# Patient Record
Sex: FEMALE | Race: WHITE | NOT HISPANIC OR LATINO | Employment: UNEMPLOYED | URBAN - METROPOLITAN AREA
[De-identification: names, ages, dates, MRNs, and addresses within clinical notes are randomized per-mention and may not be internally consistent; named-entity substitution may affect disease eponyms.]

---

## 2024-12-06 ENCOUNTER — OFFICE VISIT (OUTPATIENT)
Dept: SURGERY | Facility: CLINIC | Age: 39
End: 2024-12-06
Payer: COMMERCIAL

## 2024-12-06 VITALS — HEIGHT: 64 IN | WEIGHT: 224 LBS | BODY MASS INDEX: 38.24 KG/M2

## 2024-12-06 DIAGNOSIS — K21.9 GASTROESOPHAGEAL REFLUX DISEASE, UNSPECIFIED WHETHER ESOPHAGITIS PRESENT: ICD-10-CM

## 2024-12-06 PROCEDURE — 3008F BODY MASS INDEX DOCD: CPT | Performed by: SURGERY

## 2024-12-06 PROCEDURE — 99204 OFFICE O/P NEW MOD 45 MIN: CPT | Performed by: SURGERY

## 2024-12-06 ASSESSMENT — ENCOUNTER SYMPTOMS
APPETITE CHANGE: 0
CONFUSION: 0
DIZZINESS: 0
SHORTNESS OF BREATH: 0
AGITATION: 0
DIAPHORESIS: 0
ACTIVITY CHANGE: 0
CHILLS: 0
FEVER: 0
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
## 2024-12-18 ENCOUNTER — TELEPHONE (OUTPATIENT)
Dept: PREADMISSION TESTING | Facility: HOSPITAL | Age: 39
End: 2024-12-18
Payer: COMMERCIAL

## 2024-12-18 RX ORDER — SERTRALINE HYDROCHLORIDE 100 MG/1
200 TABLET, FILM COATED ORAL DAILY
COMMUNITY

## 2024-12-18 RX ORDER — NEBIVOLOL 20 MG/1
20 TABLET ORAL DAILY
COMMUNITY

## 2024-12-18 RX ORDER — TIRZEPATIDE 15 MG/.5ML
15 INJECTION, SOLUTION SUBCUTANEOUS WEEKLY
COMMUNITY

## 2024-12-18 RX ORDER — ALPRAZOLAM 2 MG/1
2 TABLET ORAL DAILY
COMMUNITY

## 2024-12-18 RX ORDER — BISMUTH SUBSALICYLATE 262 MG
1 TABLET,CHEWABLE ORAL DAILY
COMMUNITY

## 2024-12-18 RX ORDER — OMEPRAZOLE 40 MG/1
40 CAPSULE, DELAYED RELEASE ORAL
COMMUNITY

## 2024-12-18 NOTE — TELEPHONE ENCOUNTER
Pre-procedure PAT phone assessment completed. Pre-operative and medication instructions reviewed with patient. Pt states last dose of Mounjaro was 12/15. Instructed to hold MVI now. Patient verbalizes understanding of instructions.  SURGERY PRE-OPERATIVE INSTRUCTIONS    *You will receive a phone call the day before your procedure  after 2pm, (or the Friday before your surgery if scheduled on a Monday.) Generally the hospital will be calling you with this information after that time.    *You are not to eat after midnight the night before the surgery. You may have up to 13 ounces of clear liquids up until 2 hours prior to arriving to the hospital. The exception is with medications you were instructed to take day of surgery.    *You may take tylenol for pain/discomfort as needed.     *Stop taking all aspirin products, ibuprofen (motrin/advil), naproxen (aleve/naprosyn) for one week prior to surgery.    *Stop taking all vitamins and supplements one week prior to surgery.     *You should not have alcoholic beverages for 24 hours before surgery.     *You should not smoke 24 hours prior to surgery.     *To help prevent surgical infections bathe/shower with Dial soap the evening before surgery.    *You can wear deodorant but no lotion, powder, or perfume/cologne. You should remove all make-up and nail polish at home.    *If you wear glasses, please bring a case for the glasses with you.    *You will be asked to remove dentures and contacts.     *Please leave all valuables at home.    *You should wear loose, comfortable clothing that will accommodate bandages and/or casts.    *You should notify your doctor of any change in your condition (fever, cold, rash, etc). Surgery may need to be re-scheduled until a time you are in better health.    *A responsible adult is required to accompany you to and from the hospital if you are receiving anesthesia or a sedative. Patients are not permitted to drive for 24 hours after anesthesia.      *You can use the  [employer] if you wish.     *If you have any further questions please call [facility] [phone].

## 2024-12-20 ENCOUNTER — ANESTHESIA EVENT (OUTPATIENT)
Dept: OPERATING ROOM | Facility: HOSPITAL | Age: 39
End: 2024-12-20
Payer: COMMERCIAL

## 2024-12-20 RX ORDER — ONDANSETRON HYDROCHLORIDE 2 MG/ML
4 INJECTION, SOLUTION INTRAVENOUS ONCE AS NEEDED
Status: CANCELLED | OUTPATIENT
Start: 2024-12-20

## 2024-12-20 RX ORDER — DROPERIDOL 2.5 MG/ML
0.62 INJECTION, SOLUTION INTRAMUSCULAR; INTRAVENOUS ONCE AS NEEDED
Status: CANCELLED | OUTPATIENT
Start: 2024-12-20

## 2024-12-23 ENCOUNTER — HOSPITAL ENCOUNTER (OUTPATIENT)
Dept: OPERATING ROOM | Facility: HOSPITAL | Age: 39
Setting detail: OUTPATIENT SURGERY
Discharge: HOME | End: 2024-12-23
Payer: COMMERCIAL

## 2024-12-23 ENCOUNTER — ANESTHESIA (OUTPATIENT)
Dept: OPERATING ROOM | Facility: HOSPITAL | Age: 39
End: 2024-12-23
Payer: COMMERCIAL

## 2024-12-23 VITALS
OXYGEN SATURATION: 98 % | HEART RATE: 68 BPM | RESPIRATION RATE: 15 BRPM | SYSTOLIC BLOOD PRESSURE: 123 MMHG | BODY MASS INDEX: 38.01 KG/M2 | WEIGHT: 222.66 LBS | HEIGHT: 64 IN | DIASTOLIC BLOOD PRESSURE: 76 MMHG | TEMPERATURE: 97.7 F

## 2024-12-23 DIAGNOSIS — K21.9 GASTROESOPHAGEAL REFLUX DISEASE, UNSPECIFIED WHETHER ESOPHAGITIS PRESENT: ICD-10-CM

## 2024-12-23 LAB — PREGNANCY TEST URINE, POC: NEGATIVE

## 2024-12-23 PROCEDURE — 7100000009 HC PHASE TWO TIME - INITIAL BASE CHARGE: Performed by: ANESTHESIOLOGY

## 2024-12-23 PROCEDURE — 2500000005 HC RX 250 GENERAL PHARMACY W/O HCPCS: Performed by: NURSE ANESTHETIST, CERTIFIED REGISTERED

## 2024-12-23 PROCEDURE — 3700000002 HC GENERAL ANESTHESIA TIME - EACH INCREMENTAL 1 MINUTE: Performed by: ANESTHESIOLOGY

## 2024-12-23 PROCEDURE — 2500000004 HC RX 250 GENERAL PHARMACY W/ HCPCS (ALT 636 FOR OP/ED): Performed by: NURSE ANESTHETIST, CERTIFIED REGISTERED

## 2024-12-23 PROCEDURE — 7100000010 HC PHASE TWO TIME - EACH INCREMENTAL 1 MINUTE: Performed by: ANESTHESIOLOGY

## 2024-12-23 PROCEDURE — 3600000007 HC OR TIME - EACH INCREMENTAL 1 MINUTE - PROCEDURE LEVEL TWO: Performed by: ANESTHESIOLOGY

## 2024-12-23 PROCEDURE — 2720000007 HC OR 272 NO HCPCS: Performed by: ANESTHESIOLOGY

## 2024-12-23 PROCEDURE — 43239 EGD BIOPSY SINGLE/MULTIPLE: CPT | Performed by: SURGERY

## 2024-12-23 PROCEDURE — 3700000001 HC GENERAL ANESTHESIA TIME - INITIAL BASE CHARGE: Performed by: ANESTHESIOLOGY

## 2024-12-23 PROCEDURE — 81025 URINE PREGNANCY TEST: CPT | Performed by: ANESTHESIOLOGY

## 2024-12-23 PROCEDURE — 3600000002 HC OR TIME - INITIAL BASE CHARGE - PROCEDURE LEVEL TWO: Performed by: ANESTHESIOLOGY

## 2024-12-23 RX ORDER — ONDANSETRON HYDROCHLORIDE 2 MG/ML
INJECTION, SOLUTION INTRAVENOUS AS NEEDED
Status: DISCONTINUED | OUTPATIENT
Start: 2024-12-23 | End: 2024-12-23

## 2024-12-23 RX ORDER — PROPOFOL 10 MG/ML
INJECTION, EMULSION INTRAVENOUS AS NEEDED
Status: DISCONTINUED | OUTPATIENT
Start: 2024-12-23 | End: 2024-12-23

## 2024-12-23 RX ORDER — MIDAZOLAM HYDROCHLORIDE 1 MG/ML
INJECTION INTRAMUSCULAR; INTRAVENOUS AS NEEDED
Status: DISCONTINUED | OUTPATIENT
Start: 2024-12-23 | End: 2024-12-23

## 2024-12-23 RX ORDER — LIDOCAINE HYDROCHLORIDE 20 MG/ML
SOLUTION OROPHARYNGEAL AS NEEDED
Status: DISCONTINUED | OUTPATIENT
Start: 2024-12-23 | End: 2024-12-23

## 2024-12-23 RX ORDER — LIDOCAINE HYDROCHLORIDE 10 MG/ML
INJECTION, SOLUTION EPIDURAL; INFILTRATION; INTRACAUDAL; PERINEURAL AS NEEDED
Status: DISCONTINUED | OUTPATIENT
Start: 2024-12-23 | End: 2024-12-23

## 2024-12-23 SDOH — HEALTH STABILITY: MENTAL HEALTH: CURRENT SMOKER: 0

## 2024-12-23 ASSESSMENT — PAIN SCALES - GENERAL
PAINLEVEL_OUTOF10: 0 - NO PAIN
PAINLEVEL_OUTOF10: 0 - NO PAIN
PAIN_LEVEL: 0

## 2024-12-23 ASSESSMENT — PAIN - FUNCTIONAL ASSESSMENT
PAIN_FUNCTIONAL_ASSESSMENT: 0-10
PAIN_FUNCTIONAL_ASSESSMENT: 0-10

## 2024-12-23 NOTE — ANESTHESIA POSTPROCEDURE EVALUATION
Patient: Leeanna Storm    Procedure Summary       Date: 12/23/24 Room / Location: [facility] OR    Anesthesia Start: 0735 Anesthesia Stop: 0804    Procedures:       EGD      BRAVO Diagnosis: Gastroesophageal reflux disease, unspecified whether esophagitis present    Scheduled Providers: Allen GARBER MD; Domo Medrano MD; Noah Boyd RN Responsible Provider: Domo Medrano MD    Anesthesia Type: MAC ASA Status: 3            Anesthesia Type: MAC    Vitals Value Taken Time   /76 12/23/24 0815   Temp 36.5 °C (97.7 °F) 12/23/24 0802   Pulse 68 12/23/24 0815   Resp 15 12/23/24 0815   SpO2 98 12/23/24 1245       Anesthesia Post Evaluation    Patient location during evaluation: PACU  Patient participation: complete - patient participated  Level of consciousness: awake and alert  Pain score: 0  Pain management: adequate  Airway patency: patent  Cardiovascular status: acceptable  Respiratory status: acceptable  Hydration status: acceptable  Postoperative Nausea and Vomiting: none        No notable events documented.

## 2024-12-23 NOTE — ANESTHESIA PREPROCEDURE EVALUATION
Patient: Leeanna Storm    Procedure Information       Anesthesia Start Date/Time: 12/23/24 0735    Scheduled providers: Allen GARBER MD; Domo Medrano MD    Procedures:       EGD      BRAVO    Location: [facility] OR            Relevant Problems   No relevant active problems       Clinical information reviewed:   Tobacco  Allergies  Meds   Med Hx  Surg Hx  OB Status  Fam Hx  Soc   Hx        NPO Detail:  NPO/Void Status  Carbohydrate Drink Given Prior to Surgery? : N  Date of Last Liquid: 12/23/24  Time of Last Liquid: 0100  Date of Last Solid: 12/22/24  Time of Last Solid: 1800  Last Intake Type: Clear fluids  Time of Last Void: 0656         Physical Exam    Airway  Mallampati: III  TM distance: >3 FB  Neck ROM: full     Cardiovascular   Rhythm: regular  Rate: normal     Dental - normal exam       Pulmonary - normal exam     Abdominal - normal exam             Anesthesia Plan    History of general anesthesia?: yes  History of complications of general anesthesia?: no    ASA 3     MAC     The patient is not a current smoker.  Patient was previously instructed to abstain from smoking on day of procedure.  Patient did not smoke on day of procedure.    Anesthetic plan and risks discussed with patient.  Use of blood products discussed with patient who consented to blood products.    Plan discussed with attending.

## 2025-01-02 LAB
LAB AP ASR DISCLAIMER: NORMAL
LABORATORY COMMENT REPORT: NORMAL
PATH REPORT.FINAL DX SPEC: NORMAL
PATH REPORT.GROSS SPEC: NORMAL
PATH REPORT.RELEVANT HX SPEC: NORMAL
PATH REPORT.TOTAL CANCER: NORMAL

## 2025-01-03 ENCOUNTER — DOCUMENTATION (OUTPATIENT)
Dept: SURGERY | Facility: CLINIC | Age: 40
End: 2025-01-03
Payer: COMMERCIAL

## 2025-01-03 DIAGNOSIS — A04.8 H. PYLORI INFECTION: ICD-10-CM

## 2025-01-03 DIAGNOSIS — Z98.84 S/P BARIATRIC SURGERY: ICD-10-CM

## 2025-01-03 RX ORDER — METRONIDAZOLE 500 MG/1
500 TABLET ORAL 3 TIMES DAILY
Qty: 42 TABLET | Refills: 0 | Status: SHIPPED | OUTPATIENT
Start: 2025-01-03 | End: 2025-01-17

## 2025-01-03 RX ORDER — TETRACYCLINE HYDROCHLORIDE 500 MG/1
500 TABLET, FILM COATED ORAL 4 TIMES DAILY
Qty: 56 EACH | Refills: 0 | Status: SHIPPED | OUTPATIENT
Start: 2025-01-03 | End: 2025-01-17

## 2025-01-03 RX ORDER — OMEPRAZOLE 40 MG/1
40 CAPSULE, DELAYED RELEASE ORAL
Qty: 28 CAPSULE | Refills: 0 | Status: SHIPPED | OUTPATIENT
Start: 2025-01-03 | End: 2025-01-17

## 2025-01-03 NOTE — PROGRESS NOTES
Called patient to review surgical pathology results/positive h pylori.  Medications were sent to her pharmacy to treat the infection, patient verbalized understanding that she will repeat h pylori test after completing medications.  Also discussed positive Bravo result and recommendation by Dr. Irby to proceed toward a revision of her sleeve gastrectomy to a gastric bypass as patient is still suffering from severe GERD symptoms.  Dr. Irby would like patient to do cardiac, psychology, and dietician clearances.  Patient verbalized understanding.  Email sent to patient with all of this information.  Bariatric surgery checklist created.

## 2025-01-07 DIAGNOSIS — A04.8 H. PYLORI INFECTION: ICD-10-CM

## 2025-01-07 RX ORDER — LEVOFLOXACIN 500 MG/1
500 TABLET, FILM COATED ORAL DAILY
Qty: 14 TABLET | Refills: 0 | Status: SHIPPED | OUTPATIENT
Start: 2025-01-07 | End: 2025-01-21

## 2025-01-07 NOTE — PROGRESS NOTES
Spoke with patient who called because she was unable to  her tetracycline prescription due to her insurance not covering it.  I then spoke with Dr. Irby and he would like to try levofloxacin instead.  Medication ordered and patient called to inform her of the change.

## 2025-01-09 ENCOUNTER — NUTRITION (OUTPATIENT)
Dept: SURGERY | Facility: CLINIC | Age: 40
End: 2025-01-09
Payer: COMMERCIAL

## 2025-01-09 VITALS — BODY MASS INDEX: 38.93 KG/M2 | HEIGHT: 64 IN | WEIGHT: 228 LBS

## 2025-01-09 NOTE — PROGRESS NOTES
"Initial Bariatric Nutrition Assessment    Surgeon:   Surekha  Patient is considering: sleeve gastrectomy (2016 in [place]) -->RNYGB    ASSESSMENT:  Current weight:   Vitals:    01/09/25 1501   Weight: 103 kg (228 lb)     Ht:  1.626 m (5' 4\")   BMI:  Body mass index is 39.14 kg/m².        Pre-Op Excess Body Weight (EBW):   83lbs    Target Post-Op weight goal:         This is a 39 y.o. female with morbid obesity (Body mass index is 39.14 kg/m².) who presents to clinic for consideration of bariatric surgery. she has attempted and failed multiple diet and exercise regimens for weight loss. The pt is status post prior gastric band in 2010, followed by sleeve gastrectomy in 2016 in [place]. Pre op sleeve she was 300lbs and got down to 205lbs. Started gaining weight over the past few year. The pt believes that it might be related to limited exercise.  She presents for evaluation of chronic GERD, and weight regain.  Ever since the sleeve gastrectomy she has had issues with chronic GERD.  She uses omeprazole daily, with minimal relief of her symptoms.  She gets most of her health care at [facility].     Current diet: small portions   The patient 20 minutes on the treadmill/stepper daily. Has been doing this over the past couple of years.     Food allergies/intolerances:  no  Chewing/Swallowing/Dentition: no  Nausea / Vomiting / Hx Gastroparesis:  Nausea with reflux   Diarrhea/ Constipation: both  Smoking/Tobacco use: no  Vitamins/Minerals supplements: Barimelt MVI  Hours of sleep/night: 6-8 hours     Medications:     Current Outpatient Medications:     ALPRAZolam (Xanax) 2 mg tablet, Take 1 tablet (2 mg) by mouth once daily., Disp: , Rfl:     levoFLOXacin (Levaquin) 500 mg tablet, Take 1 tablet (500 mg) by mouth once daily for 14 days., Disp: 14 tablet, Rfl: 0    metroNIDAZOLE (Flagyl) 500 mg tablet, Take 1 tablet (500 mg) by mouth 3 times a day for 14 days., Disp: 42 tablet, Rfl: 0    multivitamin tablet, Take 1 tablet by mouth once " daily., Disp: , Rfl:     nebivolol (Bystolic) 20 mg tablet, Take 1 tablet (20 mg) by mouth once daily., Disp: , Rfl:     omeprazole (PriLOSEC) 40 mg DR capsule, Take 1 capsule (40 mg) by mouth once daily in the morning. Take before meals. Do not crush or chew., Disp: , Rfl:     omeprazole (PriLOSEC) 40 mg DR capsule, Take 1 capsule (40 mg) by mouth 2 times a day before meals for 14 days. Do not crush or chew., Disp: 28 capsule, Rfl: 0    sertraline (Zoloft) 100 mg tablet, Take 2 tablets (200 mg) by mouth once daily., Disp: , Rfl:     tetracycline 500 mg tablet, Take 500 mg by mouth 4 times a day for 14 days., Disp: 56 each, Rfl: 0    tirzepatide (Mounjaro) 15 mg/0.5 mL pen injector, Inject 15 mg under the skin 1 (one) time per week., Disp: , Rfl:       24 HOUR RECALL/DIET HISTORY:  Breakfast: eggs, salad, 1/2 toast   Snack:    Lunch: tuna or cottage cheese and tomato   Snack: turkey jerky or cucumber and cottage cheese  Dinner: salmon w/ketchup or skip   Snack:   Beverages: water (close 64 daily) and unsweet black/white tea sometimes with tsp of honey, coffee sometimes   Alcohol:  1x per year     Person responsible for cooking & shopping?   self  How often do you eat sweet snacks?   2-3x per week  How often do you eat savory snacks?  Rarely   How often do you eat out?   rarely  Do you feel overly stuffed?  Yes- rarely (nose will run)   Binge Eating?  no  Night Eating?  yes  Emotional Eating?  no       READINESS TO LEARN:  Motivation to learn: Interested        Understanding of instruction: Good     Anticipated Compliance:  Good       Family Support: yes           Educational Materials Provided:    Schedules for support group   Goals sheet    Nutrition assessment completed today.  Pt will be scheduled for video education class to discuss the 2 week pre op diet, post op protein and fluid goals, vitamin and mineral supplementation, exercise goals, and post op diet progression closer to the time of surgery    Patient is  seeking Sleeve gastrectomy conversion     The pt is continuing many of her post op behaviors after her sleeve surgery. She needs to fine tune these behaviors for clearance.  Advised to eat 3 meals per and 1-2 high protein snacks.  Recommend eating 3-4oz per meal. Reviewed the postop behaviors to fine tune.  Set a goal to start doing addition exercise of choices for  4-5x per week.     Patient was receptive to nutritional recommendations, asked numerous questions, and verbalized understanding of the weight loss surgery diet.  Patient expressed understanding about the importance of strict dietary compliance post-surgery to avoid nutritional deficiencies and achieve optimal weight loss and verbalized intent to follow dietary recommendations.    Malnutrition Screening:   Significant unintentional weight loss? n/a   Eating less than 75% of usual intake for more than 2 weeks? n/a      Nutrition Diagnosis:   Overweight/obesity related to excess energy intake as evidenced by BMI >= 40 kg/m^2.  Food- and nutrition-related knowledge deficit related to lack of prior exposure to surgical weight loss information as evidenced by pt new to surgical program.    Nutrition Interventions:   Modify type and amount of food and nutrients within meals and snacks.  Comprehensive Nutrition Education    Recommendations:        1. Avoid night time snacking.   2. Structure meal patterns, eating three meals and 1-2 snacks per day.  3. Aim for 3-4 oz (20g) protein per meal.  Have 1-2 high protein snacks that are 10-20 g protein each.  You can try a tuna or chicken packet, Greek yogurt, 2 string cheeses, Protein bars like Quest, Pure Protein, Premier, or Built Bars. you can also try protein chips form Quest or Atkins.    4. Drink 64oz of calorie-free, caffeine-free, and non-carbonated beverages. Practice taking sips.   5. Practice no drinking 30 minutes before meals, nothing with meals and wait 30 minutes after meals to drink again. Make meals  last 30 minutes-chew thoroughly.   6. Limit or omit eating out/sweets/savory snacks to 1-2 times per week.  7. Continue daily multivitamin.   8. Add video exercises 4-5x per week for 20 minutes. Increase physical activity by 10-15 minutes as tolerated to an end goal of 60 minutes 5 x per week. Consistency is the key.      Pre-op Goal weight: lose 5% of body weight    Nutrition Monitoring and Evaluation: 1-2 pound weight loss per week  Criteria: weight check  Need for Follow-up: one month     Patient does meet [employer] guidelines for weight loss surgery, however needs to demonstrate consistent effort in making dietary changes before giving clearance. It is anticipated that the patient will need at least 1-2 nutritional follow-up visits prior to clearance for surgery.    Emma Garcia MS, RD, LD  Phone: [phone]

## 2025-01-10 LAB
ELECTRONICALLY SIGNED BY: NORMAL
H. PYLORI DRUG SUSCEPTIBILITY RESULTS: NORMAL

## 2025-01-11 ENCOUNTER — OFFICE VISIT (OUTPATIENT)
Dept: CARDIOLOGY | Facility: CLINIC | Age: 40
End: 2025-01-11
Payer: COMMERCIAL

## 2025-01-11 VITALS
HEART RATE: 69 BPM | OXYGEN SATURATION: 97 % | DIASTOLIC BLOOD PRESSURE: 77 MMHG | HEIGHT: 64 IN | WEIGHT: 225 LBS | BODY MASS INDEX: 38.41 KG/M2 | SYSTOLIC BLOOD PRESSURE: 117 MMHG

## 2025-01-11 DIAGNOSIS — Z98.84 S/P BARIATRIC SURGERY: ICD-10-CM

## 2025-01-11 DIAGNOSIS — Z01.810 PRE-OPERATIVE CARDIOVASCULAR EXAMINATION, HIGH RISK SURGERY: Primary | ICD-10-CM

## 2025-01-11 PROCEDURE — 99204 OFFICE O/P NEW MOD 45 MIN: CPT | Performed by: INTERNAL MEDICINE

## 2025-01-11 PROCEDURE — 99214 OFFICE O/P EST MOD 30 MIN: CPT | Performed by: INTERNAL MEDICINE

## 2025-01-11 PROCEDURE — 3008F BODY MASS INDEX DOCD: CPT | Performed by: INTERNAL MEDICINE

## 2025-01-11 PROCEDURE — 93005 ELECTROCARDIOGRAM TRACING: CPT | Performed by: INTERNAL MEDICINE

## 2025-01-11 ASSESSMENT — PATIENT HEALTH QUESTIONNAIRE - PHQ9
SUM OF ALL RESPONSES TO PHQ9 QUESTIONS 1 AND 2: 0
2. FEELING DOWN, DEPRESSED OR HOPELESS: NOT AT ALL
1. LITTLE INTEREST OR PLEASURE IN DOING THINGS: NOT AT ALL

## 2025-01-11 ASSESSMENT — PAIN SCALES - GENERAL: PAINLEVEL_OUTOF10: 0-NO PAIN

## 2025-01-11 NOTE — PROGRESS NOTES
Date of Visit: 2025 10:30 AM EST  [place] [facility] [place] [address] [place] [address]   Type of Visit: New Patient        Chief Complaint   Patient presents with    Pre-op Clearance     DIAGNOSES: Preop cardiac evaluation.  Clinically normal CVS    HPI / Summary:   Leeanna Storm is a 39 y.o. female who presents to establish cardiac care.  She is awaiting revision bariatric surgery in view of acid reflux symptoms, pending cardiac clearance.  She denies any cardiac symptoms, however remaining reasonably active.  She lost almost 100 pounds after her surgery.    12 system review is negative except as noted above       Medical History:   Past Medical History:   Diagnosis Date    Acute maxillary sinusitis, unspecified 2017    Acute maxillary sinusitis, unspecified    Acute pancreatitis without necrosis or infection, unspecified ([facility]) 2017    Subacute pancreatitis    ADHD     Allergic dermatosis 10/24/2016    Anemia     Anxiety     Class 2 obesity with body mass index (BMI) of 38.0 to 38.9 in adult 2024    Depression     DM (diabetes mellitus) (Multi)     GERD (gastroesophageal reflux disease)     Hematoma 08/10/2016    History of acute pharyngitis 2015    History of anxiety disorder     History of anxiety disorder    History of blood transfusion      for thigh injury  no reaction    History of chest pain 10/04/2017    HLD (hyperlipidemia)     HTN (hypertension)     Lymphedema     bilateral lower legs    Obesity     Other conditions influencing health status 2015    History of pregnancy    PCOS (polycystic ovarian syndrome)     PTSD (post-traumatic stress disorder)        Surgical History:   Past Surgical History:   Procedure Laterality Date    APPENDECTOMY  2024    BREAST SURGERY      breast reduction     SECTION, LOW TRANSVERSE      DILATION AND CURETTAGE OF UTERUS      GASTRIC BYPASS      KNEE SURGERY  2014    Knee Surgery Left    LEG SURGERY Right     thigh hematoma  removed    LIPECTOMY      belt    LIPOMA RESECTION      left knee and right thigh    OOPHORECTOMY  11/25/2024    OTHER SURGICAL HISTORY Left     thumb pin    TONSILLECTOMY  03/19/2014    Tonsillectomy    TUBAL LIGATION         Family History:   No family history on file.    Social History:   Tobacco Use: Medium Risk (12/23/2024)    Patient History     Smoking Tobacco Use: Former     Smokeless Tobacco Use: Never     Passive Exposure: Not on file             MEDICATIONS:   Current Outpatient Medications   Medication Instructions    ALPRAZolam (XANAX) 2 mg, Daily    levoFLOXacin (LEVAQUIN) 500 mg, oral, Daily    metroNIDAZOLE (FLAGYL) 500 mg, oral, 3 times daily    Mounjaro 15 mg, Weekly    multivitamin tablet 1 tablet, Daily    nebivolol (BYSTOLIC) 20 mg, Daily    omeprazole (PRILOSEC) 40 mg, oral, 2 times daily before meals, Do not crush or chew.    sertraline (ZOLOFT) 200 mg, Daily    tetracycline 500 mg, oral, 4 times daily           LABS:  CBC:   Lab Results   Component Value Date    WBC 9.9 10/04/2022    RBC 5.04 10/04/2022    HGB 14.9 10/04/2022    HCT 44.6 10/04/2022    MCV 88 10/04/2022    MCHC 33.4 10/04/2022    RDW 13.2 10/04/2022     10/04/2022     CBC with Differential:    Lab Results   Component Value Date    WBC 9.9 10/04/2022    RBC 5.04 10/04/2022    HGB 14.9 10/04/2022    HCT 44.6 10/04/2022     10/04/2022    MCV 88 10/04/2022    MCHC 33.4 10/04/2022    RDW 13.2 10/04/2022    NRBC 0.0 06/17/2022    LYMPHOPCT 27.3 10/04/2022    MONOPCT 6.3 10/04/2022    EOSPCT 3.6 10/04/2022    BASOPCT 0.3 10/04/2022    MONOSABS 0.62 10/04/2022    LYMPHSABS 2.71 10/04/2022    EOSABS 0.36 10/04/2022    BASOSABS 0.03 10/04/2022     CMP:    Lab Results   Component Value Date     10/04/2022    K 4.6 10/04/2022     10/04/2022    CO2 27 10/04/2022    BUN 13 10/04/2022    CREATININE 0.70 10/04/2022    GLUCOSE 81 10/04/2022    PROT 6.8 10/04/2022    CALCIUM 9.5 10/04/2022    BILITOT 0.3 10/04/2022     "ALKPHOS 85 10/04/2022    AST 15 10/04/2022    ALT 13 10/04/2022     BMP:    Lab Results   Component Value Date     10/04/2022    K 4.6 10/04/2022     10/04/2022    CO2 27 10/04/2022    BUN 13 10/04/2022    CREATININE 0.70 10/04/2022    CALCIUM 9.5 10/04/2022    GLUCOSE 81 10/04/2022     Magnesium:No results found for: \"MG\"  Troponin:  No results found for: \"TROPHS\"  BNP: No results found for: \"BNP\"    Lipid Panel:  Lab Results   Component Value Date    HDL 56.8 10/04/2022    CHHDL 3.7 10/04/2022    VLDL 28 10/04/2022    TRIG 139 10/04/2022        Lab work and imaging results independently reviewed by me         Visit Vitals  /77 (BP Location: Left arm, Patient Position: Sitting, BP Cuff Size: Large adult)   Pulse 69   Ht 1.626 m (5' 4\")   Wt 102 kg (225 lb)   SpO2 97%   BMI 38.62 kg/m²   OB Status Having periods   Smoking Status Former   BSA 2.15 m²          ECG dated 1/11/2025 independently reviewed.  WNL      Physical Exam  On examination, she was comfortably sitting.  HEENT: normal, Neck: supple, Carotids: brisk upstroke, JVP: normal, CVS: Rate and rhythm regular, S1S2, Chest: CTAB, Abdomen: soft nontender, no organomegaly appreciated, Extremities: without any edema, CNS: HMF normal, no focal neurological deficit.    DIAGNOSES: Preop cardiac evaluation.  Clinically normal CVS    Clinically she has normal cardiovascular system and as such may be taken up for surgery as planned with a low risk of cardiovascular complications.            01/11/25 at 10:05 AM - Ashley Gomez MD        Followup Appts:  Future Appointments   Date Time Provider Department Center   1/11/2025 10:30 AM Ashley Gomez MD [id number] [facility]   2/7/2025 10:00 AM Emma Garcia RDN, LD [id number]      "

## 2025-01-13 ENCOUNTER — DOCUMENTATION (OUTPATIENT)
Dept: SURGERY | Facility: CLINIC | Age: 40
End: 2025-01-13
Payer: COMMERCIAL

## 2025-01-16 LAB
ATRIAL RATE: 74 BPM
P AXIS: 14 DEGREES
P OFFSET: 194 MS
P ONSET: 138 MS
PR INTERVAL: 164 MS
Q ONSET: 220 MS
QRS COUNT: 12 BEATS
QRS DURATION: 94 MS
QT INTERVAL: 390 MS
QTC CALCULATION(BAZETT): 432 MS
QTC FREDERICIA: 418 MS
R AXIS: 41 DEGREES
T AXIS: 34 DEGREES
T OFFSET: 415 MS
VENTRICULAR RATE: 74 BPM

## 2025-01-22 ENCOUNTER — TELEPHONE (OUTPATIENT)
Dept: SURGERY | Facility: CLINIC | Age: 40
End: 2025-01-22
Payer: COMMERCIAL

## 2025-01-22 NOTE — TELEPHONE ENCOUNTER
Patient called in, she finished her H-Pylori medications and asking the next steps. Reached out to Dr. Irby to have him call the patient with next steps.     Patient updated the office with clearances completed.

## 2025-01-27 ENCOUNTER — TELEPHONE (OUTPATIENT)
Dept: SURGERY | Facility: CLINIC | Age: 40
End: 2025-01-27
Payer: COMMERCIAL

## 2025-01-27 DIAGNOSIS — A04.8 H. PYLORI INFECTION: ICD-10-CM

## 2025-01-27 NOTE — TELEPHONE ENCOUNTER
Called patient due to receiving notification that she had called the office with questions.  Patient has completed her clearances for a sleeve to bypass revision surgery, but she still has to retest for H pylori to ensure the infection is cleared prior to having surgery.  This was also verified by Dr. Irby.  Patient told that we cannot schedule surgery until we have insurance approval and a negative H pylori test.  The patient completed her medications to treat H pylori on 1/23.  The soonest date we can retest is 3/3, patient verbalized understanding.  Lab order placed.

## 2025-02-07 ENCOUNTER — APPOINTMENT (OUTPATIENT)
Dept: SURGERY | Facility: CLINIC | Age: 40
End: 2025-02-07
Payer: COMMERCIAL

## 2025-03-03 DIAGNOSIS — A04.8 H. PYLORI INFECTION: ICD-10-CM

## 2025-03-05 LAB
IRON SATN MFR SERPL: 21 % (CALC) (ref 16–45)
IRON SERPL-MCNC: 68 MCG/DL (ref 40–190)
TIBC SERPL-MCNC: 322 MCG/DL (CALC) (ref 250–450)
UREA BREATH TEST QL: NORMAL
VIT B12 SERPL-MCNC: 353 PG/ML (ref 200–1100)

## 2025-03-07 ENCOUNTER — DOCUMENTATION (OUTPATIENT)
Dept: SURGERY | Facility: CLINIC | Age: 40
End: 2025-03-07
Payer: COMMERCIAL

## 2025-03-07 DIAGNOSIS — Z98.84 BARIATRIC SURGERY STATUS: ICD-10-CM

## 2025-03-07 DIAGNOSIS — A04.8 H. PYLORI INFECTION: ICD-10-CM

## 2025-03-07 NOTE — PROGRESS NOTES
Labs re-ordered for H pylori breath test, nicotine, and urine tox screen.  Patient went to the lab yesterday to complete her labs and these ones were not completed.  Called patient to inform her, left VM.

## 2025-03-27 LAB
1OH-MIDAZOLAM UR-MCNC: NEGATIVE NG/ML
7AMINOCLONAZEPAM UR-MCNC: NEGATIVE NG/ML
A-OH ALPRAZ UR-MCNC: 479 NG/ML
A-OH-TRIAZOLAM UR-MCNC: NEGATIVE NG/ML
AMPHETAMINES UR QL: NEGATIVE NG/ML
BARBITURATES UR QL: NEGATIVE NG/ML
BENZODIAZ UR QL: POSITIVE NG/ML
BZE UR QL: NEGATIVE NG/ML
CREAT UR-MCNC: 167.6 MG/DL
DRUG SCREEN COMMENT UR-IMP: ABNORMAL
LORAZEPAM UR-MCNC: 634 NG/ML
METHADONE UR QL: NEGATIVE NG/ML
NORDIAZEPAM UR-MCNC: NEGATIVE NG/ML
OH-ETHYLFLURAZ UR-MCNC: NEGATIVE NG/ML
OPIATES UR QL: NEGATIVE NG/ML
OXAZEPAM UR-MCNC: NEGATIVE NG/ML
OXIDANTS UR QL: NEGATIVE MCG/ML
OXYCODONE UR QL: NEGATIVE NG/ML
PCP UR QL: NEGATIVE NG/ML
PH UR: 5.8 [PH] (ref 4.5–9)
QUEST NOTES AND COMMENTS: ABNORMAL
TEMAZEPAM UR-MCNC: NEGATIVE NG/ML
THC UR QL: NEGATIVE NG/ML

## 2025-03-28 LAB
COTININE SERPL-MCNC: 210 NG/ML
NICOTINE SERPL-MCNC: 15 NG/ML
UREA BREATH TEST QL: NOT DETECTED

## 2025-04-11 DIAGNOSIS — Z98.84 S/P BARIATRIC SURGERY: ICD-10-CM

## 2025-04-11 DIAGNOSIS — K21.9 GASTROESOPHAGEAL REFLUX DISEASE, UNSPECIFIED WHETHER ESOPHAGITIS PRESENT: ICD-10-CM

## 2025-04-11 RX ORDER — OMEPRAZOLE 40 MG/1
40 CAPSULE, DELAYED RELEASE ORAL
Qty: 180 CAPSULE | Refills: 1 | Status: SHIPPED | OUTPATIENT
Start: 2025-04-11 | End: 2025-10-08

## 2025-04-11 NOTE — PROGRESS NOTES
Spoke with patient regarding nicotine level still being positive.  Per Dr. Irby she will need to be free from nicotine in order to be a candidate for a revision.  Patient verbalized understanding, states she will have a hard time with this as she still chews nicotine gum and it will be hard for her to stop.  Sent refill for Omeprazole to patient's pharmacy per patient's request.

## 2025-04-22 DIAGNOSIS — Z87.891 FORMER SMOKER: ICD-10-CM

## 2025-04-22 NOTE — PROGRESS NOTES
Patient called stating she has quit using the nicotine gum and would like to be retested for nicotine so she can qualify for her sleeve to bypass revision surgery.  Order placed for patient to get her bloodwork done.  Explained to patient that she would not only have to stay clear from nicotine now but it is especially important that she remain nicotine free after surgery as well.  Patient verbalized understanding.